# Patient Record
Sex: MALE | Race: WHITE | NOT HISPANIC OR LATINO | Employment: UNEMPLOYED
[De-identification: names, ages, dates, MRNs, and addresses within clinical notes are randomized per-mention and may not be internally consistent; named-entity substitution may affect disease eponyms.]

---

## 2019-11-07 ENCOUNTER — HEALTH MAINTENANCE LETTER (OUTPATIENT)
Age: 24
End: 2019-11-07

## 2020-11-29 ENCOUNTER — HEALTH MAINTENANCE LETTER (OUTPATIENT)
Age: 25
End: 2020-11-29

## 2021-09-25 ENCOUNTER — HEALTH MAINTENANCE LETTER (OUTPATIENT)
Age: 26
End: 2021-09-25

## 2022-01-09 ENCOUNTER — HEALTH MAINTENANCE LETTER (OUTPATIENT)
Age: 27
End: 2022-01-09

## 2022-09-22 ENCOUNTER — TELEPHONE (OUTPATIENT)
Dept: CALL CENTER | Age: 27
End: 2022-09-22

## 2022-09-22 ENCOUNTER — NURSE TRIAGE (OUTPATIENT)
Dept: NURSING | Facility: CLINIC | Age: 27
End: 2022-09-22

## 2022-09-22 NOTE — TELEPHONE ENCOUNTER
"27 year old male is a  and a chip of metal flew into his eye yesterday morning  [facility] and [facility] so he was unable to get help.  They told him they could see a piece of metal in his cornea but were unable to access equipment to remove it  He was given some antibiotic eye gtts on his way out and told to come back today but the [facility] is still in network failure   He would like to get in to have the metal removed   He states his right eye is red and very irritated  It is constantly watering.  He states he is willing to pay out of pocket      Will forward to eye team to review and call Kamari back with recommendations        Reason for Disposition    Patient wants to be seen    Foreign body (FB) hit eye at high speed (e.g., small metallic chip from hammering, lawnmower, BB gun, explosion)    Additional Information    Negative: Doesn't sound like FB in the eye    Negative: Foreign body is a chemical    Negative: Foreign body (FB) stuck on eyeball    Negative: Sharp FB (even if FB was removed) and any pain present now    Negative: Eye has been washed out > 30 minutes ago and still feels like FB is still present    Negative: Blurred vision that persists >1 hour after irrigation (regardless of duration of flushing)    Negative: Eye pain that persists > 1 hour after irrigation (regardless of duration of flushing)    Negative: Tearing or blinking that persists > 1 hour since irrigation (regardless of duration of flushing)    Negative: Cloudy spot on the cornea (clear part of the eye)    Negative: Sounds like a serious injury to the triager    Negative: SEVERE eye pain    Answer Assessment - Initial Assessment Questions  1. TYPE OF FOREIGN BODY: \"What got in the eye?\"       A piece of metal  2. ONSET: \"When did it happen?\"       Yesterday morning  3. MECHANISM: \"How did it happen?\"       He is a  and a piece flew in eye  4. VISION: \"Do you have blurred " "vision?\"       blurred  5. PAIN: \"Is it painful?\" If Yes, ask: \"How bad is the pain?\"  (Scale 1-10; or mild, moderate, severe)      moderate  6. CONTACTS: \"Do you wear contacts?\"        7. OTHER SYMPTOMS: \"Do you have any other symptoms?\"      Red and watering    8. PREGNANCY: \"Is there any chance you are pregnant?\" \"When was your last menstrual period?\"      no    Protocols used: EYE - FOREIGN BODY-A-OH      "

## 2022-09-22 NOTE — TELEPHONE ENCOUNTER
Caller reporting the following red-flag symptom(s): Metal in right eye    Per the system red-flag symptom policy, patient was instructed to:  speak with a Registered Nurse    Action:  Patient warm transferred to a Registered Nurse

## 2022-12-25 ENCOUNTER — HEALTH MAINTENANCE LETTER (OUTPATIENT)
Age: 27
End: 2022-12-25

## 2023-04-16 ENCOUNTER — HEALTH MAINTENANCE LETTER (OUTPATIENT)
Age: 28
End: 2023-04-16

## 2023-09-08 ENCOUNTER — OFFICE VISIT (OUTPATIENT)
Dept: URGENT CARE | Facility: URGENT CARE | Age: 28
End: 2023-09-08
Payer: COMMERCIAL

## 2023-09-08 VITALS
HEART RATE: 86 BPM | RESPIRATION RATE: 22 BRPM | SYSTOLIC BLOOD PRESSURE: 126 MMHG | TEMPERATURE: 98.1 F | WEIGHT: 217 LBS | DIASTOLIC BLOOD PRESSURE: 85 MMHG | OXYGEN SATURATION: 98 %

## 2023-09-08 DIAGNOSIS — H66.002 NON-RECURRENT ACUTE SUPPURATIVE OTITIS MEDIA OF LEFT EAR WITHOUT SPONTANEOUS RUPTURE OF TYMPANIC MEMBRANE: Primary | ICD-10-CM

## 2023-09-08 PROCEDURE — 99203 OFFICE O/P NEW LOW 30 MIN: CPT | Performed by: PHYSICIAN ASSISTANT

## 2023-09-08 RX ORDER — TRAZODONE HYDROCHLORIDE 100 MG/1
1 TABLET ORAL
COMMUNITY
Start: 2023-07-07

## 2023-09-08 RX ORDER — PROPRANOLOL HYDROCHLORIDE 40 MG/1
40 TABLET ORAL
COMMUNITY

## 2023-09-08 RX ORDER — PANTOPRAZOLE SODIUM 40 MG/1
40 TABLET, DELAYED RELEASE ORAL
COMMUNITY
Start: 2023-02-23

## 2023-09-08 RX ORDER — NEOMYCIN SULFATE, POLYMYXIN B SULFATE, HYDROCORTISONE 3.5; 10000; 1 MG/ML; [USP'U]/ML; MG/ML
3 SOLUTION/ DROPS AURICULAR (OTIC) 4 TIMES DAILY
Qty: 10 ML | Refills: 0 | Status: SHIPPED | OUTPATIENT
Start: 2023-09-08

## 2023-09-08 RX ORDER — AZITHROMYCIN 250 MG/1
TABLET, FILM COATED ORAL
Qty: 6 TABLET | Refills: 0 | Status: SHIPPED | OUTPATIENT
Start: 2023-09-08 | End: 2023-09-13

## 2023-09-08 RX ORDER — POTASSIUM CITRATE 10 MEQ/1
TABLET, EXTENDED RELEASE ORAL
COMMUNITY
Start: 2023-01-09 | End: 2024-01-10

## 2023-09-08 RX ORDER — SUMATRIPTAN 50 MG/1
50 TABLET, FILM COATED ORAL
COMMUNITY
Start: 2023-02-23

## 2023-09-08 ASSESSMENT — ENCOUNTER SYMPTOMS
FEVER: 0
COUGH: 1
SORE THROAT: 1
RHINORRHEA: 0
HEADACHES: 1

## 2023-09-08 ASSESSMENT — PAIN SCALES - GENERAL: PAINLEVEL: WORST PAIN (10)

## 2023-09-08 NOTE — PROGRESS NOTES
SUBJECTIVE:   Kamari Hart is a 28 year old male presenting with a chief complaint of   Chief Complaint   Patient presents with    Urgent Care    Ear Problem     Per patient has been sick for about a week [facility] and [facility] & blood work all negative but now having extreme ear pain specially left ear , patient was given tessalon pearls but feels they don't work        He is a new patient of [facility].  Patient presents with complaints of left ear pain x 7 days.  [facility], and viral panel.  Elevate WBC with elevated neutrophils.  He was given only tessalon perles.  Patient states today ear pain.      Treatment:  dayquil, niquil, tessalon perles, afrin, cepacol.     Review of Systems   Constitutional:  Negative for fever.   HENT:  Positive for ear pain and sore throat. Negative for congestion and rhinorrhea.    Respiratory:  Positive for cough.    Neurological:  Positive for headaches.   All other systems reviewed and are negative.      Past Medical History:   Diagnosis Date    Constipation     Headaches     Migraine 12/6/2011    RAD (reactive airway disease)      Family History   Problem Relation Age of Onset    Unknown/Adopted Other         no significant fam hx per patient    Hypertension Maternal Grandmother     Arthritis Maternal Grandmother     Allergies Maternal Grandmother     Gastrointestinal Disease Maternal Grandmother         ulcer    Thyroid Disease Maternal Grandmother     Lipids Maternal Grandmother     Lipids Maternal Grandfather     Hypertension Maternal Grandfather     Hearing Loss Maternal Grandfather     Cerebrovascular Disease Paternal Grandmother     Cancer No family hx of     Diabetes No family hx of     Glaucoma No family hx of     Macular Degeneration No family hx of      Current Outpatient Medications   Medication Sig Dispense Refill    azithromycin (ZITHROMAX) 250 MG tablet Take 2 tablets (500 mg) by mouth daily for 1 day, THEN 1 tablet (250 mg)  daily for 4 days. 6 tablet 0    neomycin-polymyxin-hydrocortisone (CORTISPORIN) 3.5-02992-3 otic solution Place 3 drops Into the left ear 4 times daily 10 mL 0    pantoprazole (PROTONIX) 40 MG EC tablet 40 mg      potassium citrate (UROCIT-K) 10 MEQ (1080 MG) CR tablet TAKE TWO TABLETS BY MOUTH TWO TIMES A DAY FOR KIDNEY STONES -TAKE WITH BREAKFAST AND DINNER DAILY      propranolol (INDERAL) 40 MG tablet Take 40 mg by mouth      SUMAtriptan (IMITREX) 50 MG tablet 50 mg      traZODone (DESYREL) 100 MG tablet Take 1 tablet by mouth nightly as needed      OVER-THE-COUNTER Place 1 drop into both eyes 2 times daily as needed (Use when eyes are itchy). Zaditor or Alaway over the counter Allergy Drop (Patient not taking: Reported on 9/8/2023) 1 Bottle     rizatriptan (MAXALT) 5 MG tablet Take 1 tablet by mouth at onset of headache for migraine. May repeat in 2 hours if needed: max 2/day;. (Patient not taking: Reported on 9/8/2023) 20 tablet 11     Social History     Tobacco Use    Smoking status: Never    Smokeless tobacco: Never    Tobacco comments:     Lives in smoke free household   Substance Use Topics    Alcohol use: No       OBJECTIVE  /85   Pulse 86   Temp 98.1  F (36.7  C) (Tympanic)   Resp 22   Wt 98.4 kg (217 lb)   SpO2 98%     Physical Exam  Vitals and nursing note reviewed.   Constitutional:       General: He is not in acute distress.     Appearance: Normal appearance. He is obese. He is not ill-appearing.   HENT:      Head: Normocephalic and atraumatic.      Right Ear: Tympanic membrane, ear canal and external ear normal.      Left Ear: Ear canal and external ear normal.      Ears:      Comments: Left TM erythematous and bulging     Nose: Nose normal.      Mouth/Throat:      Mouth: Mucous membranes are moist.      Pharynx: Oropharynx is clear. Posterior oropharyngeal erythema present.   Eyes:      Extraocular Movements: Extraocular movements intact.      Conjunctiva/sclera: Conjunctivae normal.    Cardiovascular:      Rate and Rhythm: Normal rate and regular rhythm.      Pulses: Normal pulses.      Heart sounds: Normal heart sounds.   Pulmonary:      Effort: Pulmonary effort is normal.      Breath sounds: Normal breath sounds.   Musculoskeletal:      Cervical back: Normal range of motion and neck supple. No rigidity. No muscular tenderness.   Skin:     General: Skin is warm and dry.   Neurological:      General: No focal deficit present.      Mental Status: He is alert.   Psychiatric:         Mood and Affect: Mood normal.         Behavior: Behavior normal.         Labs:  No results found for this or any previous visit (from the past 24 hour(s)).    X-Ray was not done.    ASSESSMENT:      ICD-10-CM    1. Non-recurrent acute suppurative otitis media of left ear without spontaneous rupture of tympanic membrane  H66.002 azithromycin (ZITHROMAX) 250 MG tablet     neomycin-polymyxin-hydrocortisone (CORTISPORIN) 3.5-92090-0 otic solution           Medical Decision Making:    Differential Diagnosis:  URI Adult/Peds:  Acute left otitis media and Otitis externa    Serious Comorbid Conditions:  Adult:   reviewed    PLAN:    Rx for zpak and cortisporin.  Discussed reasons to seek immediate medical attention.  Additionally if no improvement or worsening in one week, may follow up with PCP and/or UC.        Followup:    If not improving or if condition worsens, follow up with your Primary Care Provider, If not improving or if conditions worsens over the next 12-24 hours, go to the Emergency Department    There are no Patient Instructions on file for this visit.

## 2023-09-21 ENCOUNTER — OFFICE VISIT (OUTPATIENT)
Dept: URGENT CARE | Facility: URGENT CARE | Age: 28
End: 2023-09-21
Payer: COMMERCIAL

## 2023-09-21 ENCOUNTER — ANCILLARY PROCEDURE (OUTPATIENT)
Dept: GENERAL RADIOLOGY | Facility: CLINIC | Age: 28
End: 2023-09-21
Attending: NURSE PRACTITIONER
Payer: COMMERCIAL

## 2023-09-21 VITALS
TEMPERATURE: 97.7 F | OXYGEN SATURATION: 96 % | HEART RATE: 82 BPM | DIASTOLIC BLOOD PRESSURE: 79 MMHG | SYSTOLIC BLOOD PRESSURE: 115 MMHG | WEIGHT: 213.4 LBS

## 2023-09-21 DIAGNOSIS — J01.90 ACUTE BACTERIAL RHINOSINUSITIS: ICD-10-CM

## 2023-09-21 DIAGNOSIS — R11.2 NAUSEA AND VOMITING, UNSPECIFIED VOMITING TYPE: ICD-10-CM

## 2023-09-21 DIAGNOSIS — R53.81 MALAISE: ICD-10-CM

## 2023-09-21 DIAGNOSIS — R05.1 ACUTE COUGH: Primary | ICD-10-CM

## 2023-09-21 DIAGNOSIS — R19.7 DIARRHEA, UNSPECIFIED TYPE: ICD-10-CM

## 2023-09-21 DIAGNOSIS — R09.81 CONGESTION OF PARANASAL SINUS: ICD-10-CM

## 2023-09-21 DIAGNOSIS — B96.89 ACUTE BACTERIAL RHINOSINUSITIS: ICD-10-CM

## 2023-09-21 PROBLEM — F43.12 CHRONIC POST-TRAUMATIC STRESS DISORDER (PTSD): Status: ACTIVE | Noted: 2023-09-21

## 2023-09-21 PROBLEM — E78.5 HYPERLIPIDEMIA: Status: ACTIVE | Noted: 2023-09-21

## 2023-09-21 PROBLEM — E66.9 OBESITY: Status: ACTIVE | Noted: 2023-09-21

## 2023-09-21 PROBLEM — J30.1 ALLERGIC RHINITIS DUE TO POLLEN: Status: ACTIVE | Noted: 2017-05-31

## 2023-09-21 PROBLEM — T88.6XXS: Status: ACTIVE | Noted: 2023-09-21

## 2023-09-21 PROBLEM — M54.2 NECK PAIN: Status: ACTIVE | Noted: 2023-09-21

## 2023-09-21 PROBLEM — G89.4 CHRONIC PAIN SYNDROME: Status: ACTIVE | Noted: 2023-09-21

## 2023-09-21 PROBLEM — G56.03 CARPAL TUNNEL SYNDROME, BILATERAL UPPER LIMBS: Status: ACTIVE | Noted: 2023-09-21

## 2023-09-21 PROBLEM — N20.0 CALCULUS OF KIDNEY: Status: ACTIVE | Noted: 2023-09-21

## 2023-09-21 PROBLEM — M54.50 LOW BACK PAIN, UNSPECIFIED: Status: ACTIVE | Noted: 2023-09-21

## 2023-09-21 PROBLEM — J30.9 ALLERGIC RHINITIS: Status: ACTIVE | Noted: 2023-09-21

## 2023-09-21 PROBLEM — F33.1 MAJOR DEPRESSIVE DISORDER, RECURRENT, MODERATE (H): Status: ACTIVE | Noted: 2023-09-21

## 2023-09-21 PROBLEM — R91.1 SOLITARY PULMONARY NODULE: Status: ACTIVE | Noted: 2023-09-21

## 2023-09-21 PROBLEM — S62.639A CLOSED FRACTURE OF DISTAL PHALANX OF FINGER: Status: ACTIVE | Noted: 2023-09-21

## 2023-09-21 LAB
BASOPHILS # BLD AUTO: 0 10E3/UL (ref 0–0.2)
BASOPHILS NFR BLD AUTO: 0 %
DEPRECATED S PYO AG THROAT QL EIA: NEGATIVE
EOSINOPHIL # BLD AUTO: 0.7 10E3/UL (ref 0–0.7)
EOSINOPHIL NFR BLD AUTO: 7 %
ERYTHROCYTE [DISTWIDTH] IN BLOOD BY AUTOMATED COUNT: 11.5 % (ref 10–15)
GROUP A STREP BY PCR: NOT DETECTED
HCT VFR BLD AUTO: 47.3 % (ref 40–53)
HGB BLD-MCNC: 16.7 G/DL (ref 13.3–17.7)
IMM GRANULOCYTES # BLD: 0 10E3/UL
IMM GRANULOCYTES NFR BLD: 0 %
LYMPHOCYTES # BLD AUTO: 1.7 10E3/UL (ref 0.8–5.3)
LYMPHOCYTES NFR BLD AUTO: 17 %
MCH RBC QN AUTO: 31 PG (ref 26.5–33)
MCHC RBC AUTO-ENTMCNC: 35.3 G/DL (ref 31.5–36.5)
MCV RBC AUTO: 88 FL (ref 78–100)
MONOCYTES # BLD AUTO: 0.5 10E3/UL (ref 0–1.3)
MONOCYTES NFR BLD AUTO: 5 %
NEUTROPHILS # BLD AUTO: 7.2 10E3/UL (ref 1.6–8.3)
NEUTROPHILS NFR BLD AUTO: 72 %
PLATELET # BLD AUTO: 295 10E3/UL (ref 150–450)
RBC # BLD AUTO: 5.38 10E6/UL (ref 4.4–5.9)
WBC # BLD AUTO: 10.1 10E3/UL (ref 4–11)

## 2023-09-21 PROCEDURE — 87635 SARS-COV-2 COVID-19 AMP PRB: CPT | Performed by: NURSE PRACTITIONER

## 2023-09-21 PROCEDURE — 87651 STREP A DNA AMP PROBE: CPT | Performed by: NURSE PRACTITIONER

## 2023-09-21 PROCEDURE — 36415 COLL VENOUS BLD VENIPUNCTURE: CPT | Performed by: NURSE PRACTITIONER

## 2023-09-21 PROCEDURE — 85025 COMPLETE CBC W/AUTO DIFF WBC: CPT | Performed by: NURSE PRACTITIONER

## 2023-09-21 PROCEDURE — 99214 OFFICE O/P EST MOD 30 MIN: CPT | Performed by: NURSE PRACTITIONER

## 2023-09-21 PROCEDURE — 71046 X-RAY EXAM CHEST 2 VIEWS: CPT | Mod: TC | Performed by: RADIOLOGY

## 2023-09-21 RX ORDER — HYDROXYZINE PAMOATE 25 MG/1
25 CAPSULE ORAL 2 TIMES DAILY PRN
COMMUNITY
Start: 2023-01-04 | End: 2024-01-05

## 2023-09-21 RX ORDER — ATORVASTATIN CALCIUM 40 MG/1
40 TABLET, FILM COATED ORAL DAILY
COMMUNITY
Start: 2023-02-23

## 2023-09-21 RX ORDER — ZOLPIDEM TARTRATE 5 MG/1
1 TABLET ORAL
COMMUNITY
Start: 2023-07-07

## 2023-09-21 RX ORDER — DOXYCYCLINE HYCLATE 100 MG
100 TABLET ORAL 2 TIMES DAILY
Qty: 14 TABLET | Refills: 0 | Status: SHIPPED | OUTPATIENT
Start: 2023-09-21 | End: 2023-09-28

## 2023-09-21 NOTE — PATIENT INSTRUCTIONS
Antibiotic take Doxycycline 2 times daily with food as this may cause stomach upset. Please take with a probiotic (not directly with) two hours prior or after, to protect good bacteria in colon.   Humidifier in room if available. Recommend saline lavage to help remove mucous and moisturize sinuses.  May continue DayQuil NyQuil as needed for cough and/or fever with headache.  Push fluids especially electrolyte replacement beverage if nausea continues make sure you are taking sips of fluids and adequate urine output.      Please follow up in clinic, with your primary care provider if symptoms not improving with treatment.

## 2023-09-21 NOTE — PROGRESS NOTES
Assessment & Plan     1. Acute cough  X-ray clear per radiology read reassuring complete blood count rapid strep negative pending strep culture pending COVID test.  Recommend will continue to self isolate until these tests are completed.  - XR Chest 2 Views  - CBC with platelets and differential  - Streptococcus A Rapid Screen w/Reflex to PCR - Clinic Collect  - Symptomatic COVID-19 Virus (Coronavirus) by PCR Nose  - Group A Streptococcus PCR Throat Swab    2. Congestion of paranasal sinus    - XR Chest 2 Views  - CBC with platelets and differential  - Streptococcus A Rapid Screen w/Reflex to PCR - Clinic Collect  - Symptomatic COVID-19 Virus (Coronavirus) by PCR Nose  - Group A Streptococcus PCR Throat Swab    3. Malaise    - XR Chest 2 Views  - CBC with platelets and differential  - Streptococcus A Rapid Screen w/Reflex to PCR - Clinic Collect  - Symptomatic COVID-19 Virus (Coronavirus) by PCR Nose  - Group A Streptococcus PCR Throat Swab    4. Nausea and vomiting, unspecified vomiting type      5. Diarrhea, unspecified type      6. Acute bacterial rhinosinusitis  Recommend treatment has been directed  Home care reviewed. Patient verbalized understanding; will monitor symptoms closely. Reviewed s/e to medications.     - doxycycline hyclate (VIBRA-TABS) 100 MG tablet; Take 1 tablet (100 mg) by mouth 2 times daily for 7 days  Dispense: 14 tablet; Refill: 0  Patient Instructions   Antibiotic take Doxycycline 2 times daily with food as this may cause stomach upset. Please take with a probiotic (not directly with) two hours prior or after, to protect good bacteria in colon.   Humidifier in room if available. Recommend saline lavage to help remove mucous and moisturize sinuses.  May continue DayQuil NyQuil as needed for cough and/or fever with headache.  Push fluids especially electrolyte replacement beverage if nausea continues make sure you are taking sips of fluids and adequate urine output.      Please follow up in  clinic, with your primary care provider if symptoms not improving with treatment.               CANDI Holbrook CNP  [facility]    Bang Benites is a 28 year old male who presents to clinic today for the following health issues:  Chief Complaint   Patient presents with    Cough     Chest congestion, fever, body aches and chills, vomiting, diarrhea, sore throat. Sx about a month.   [facility], all negative.   Pt tested Covid at home this week - negative.   Prescribed antibiotics when seen in UC on the 8th, sx improved while taking meds but worsened again after meds were gone.      HPI    Patient presents to clinic with his wife. He states that he was treated for otitis media with a azithromycin and Neosporin polymyxin eardrop.  He states symptoms improved for approximately 5 to 7 days then symptoms started to recur with chest congestion malaise sore throat.  Patient states he has had 2 family members at home test positive for COVID since this time.  [facility] to being seen in urgent care regarding the otitis media.    He is also complaining of vomiting states this started yesterday with diarrhea.  States he just woke up prior to coming to  and did not drink or eat any food today or drink any fluids today.     Review of Systems  Constitutional, HEENT, cardiovascular, pulmonary, gi and gu systems are negative, except as otherwise noted.      Objective    /79 (BP Location: Right arm, Cuff Size: Adult Large)   Pulse 82   Temp 97.7  F (36.5  C) (Tympanic)   Wt 96.8 kg (213 lb 6.4 oz)   SpO2 96%   Physical Exam   GENERAL: alert, no distress, and fatigued  EYES: Eyes grossly normal to inspection, PERRL and conjunctivae and sclerae normal  HENT: normal cephalic/atraumatic, ear canals and TM's normal, nose and mouth without ulcers or lesions, nasal mucosa edematous , rhinorrhea clear, oropharynx clear, oral  mucous membranes moist, and tonsillar erythema  NECK: bilateral anterior cervical adenopathy, no asymmetry, masses, or scars, and thyroid normal to palpation  RESP: decreased breath sounds throughout  CV: regular rate and rhythm, normal S1 S2, no S3 or S4, no murmur, click or rub, no peripheral edema and peripheral pulses strong  ABDOMEN: soft, nontender, no hepatosplenomegaly, no masses and bowel sounds normal  MS: no gross musculoskeletal defects noted, no edema  SKIN: no suspicious lesions or rashes  BACK: no CVA tenderness, no paralumbar tenderness    Results for orders placed or performed in visit on 09/21/23   XR Chest 2 Views     Status: None    Narrative    XR CHEST 2 VIEWS 9/21/2023 2:41 PM    HISTORY: Acute cough; Congestion of paranasal sinus; Malaise    COMPARISON: 11/28/2011      Impression    IMPRESSION: No acute cardiopulmonary process.    LINDSAY COE MD         SYSTEM ID:  [id number]   CBC with platelets and differential     Status: None   Result Value Ref Range    WBC Count 10.1 4.0 - 11.0 10e3/uL    RBC Count 5.38 4.40 - 5.90 10e6/uL    Hemoglobin 16.7 13.3 - 17.7 g/dL    Hematocrit 47.3 40.0 - 53.0 %    MCV 88 78 - 100 fL    MCH 31.0 26.5 - 33.0 pg    MCHC 35.3 31.5 - 36.5 g/dL    RDW 11.5 10.0 - 15.0 %    Platelet Count 295 150 - 450 10e3/uL    % Neutrophils 72 %    % Lymphocytes 17 %    % Monocytes 5 %    % Eosinophils 7 %    % Basophils 0 %    % Immature Granulocytes 0 %    Absolute Neutrophils 7.2 1.6 - 8.3 10e3/uL    Absolute Lymphocytes 1.7 0.8 - 5.3 10e3/uL    Absolute Monocytes 0.5 0.0 - 1.3 10e3/uL    Absolute Eosinophils 0.7 0.0 - 0.7 10e3/uL    Absolute Basophils 0.0 0.0 - 0.2 10e3/uL    Absolute Immature Granulocytes 0.0 <=0.4 10e3/uL   Streptococcus A Rapid Screen w/Reflex to PCR - Clinic Collect     Status: Normal    Specimen: Throat; Swab   Result Value Ref Range    Group A Strep antigen Negative Negative   CBC with platelets and differential     Status: None    Narrative    The  following orders were created for panel order CBC with platelets and differential.  Procedure                               Abnormality         Status                     ---------                               -----------         ------                     CBC with platelets and d...[[id number]]                      Final result                 Please view results for these tests on the individual orders.

## 2023-09-22 LAB — SARS-COV-2 RNA RESP QL NAA+PROBE: NEGATIVE

## 2024-06-04 ENCOUNTER — OFFICE VISIT (OUTPATIENT)
Dept: URGENT CARE | Facility: URGENT CARE | Age: 29
End: 2024-06-04

## 2024-06-04 ENCOUNTER — ANCILLARY PROCEDURE (OUTPATIENT)
Dept: GENERAL RADIOLOGY | Facility: CLINIC | Age: 29
End: 2024-06-04
Attending: FAMILY MEDICINE

## 2024-06-04 VITALS
WEIGHT: 197.8 LBS | OXYGEN SATURATION: 96 % | HEART RATE: 105 BPM | RESPIRATION RATE: 20 BRPM | SYSTOLIC BLOOD PRESSURE: 146 MMHG | DIASTOLIC BLOOD PRESSURE: 94 MMHG | TEMPERATURE: 98.2 F

## 2024-06-04 DIAGNOSIS — S61.452A DOG BITE OF LEFT HAND, INITIAL ENCOUNTER: Primary | ICD-10-CM
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
## 2024-06-04 DIAGNOSIS — S61.452A DOG BITE OF LEFT HAND, INITIAL ENCOUNTER: ICD-10-CM
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
## 2024-06-04 PROCEDURE — 99214 OFFICE O/P EST MOD 30 MIN: CPT | Performed by: FAMILY MEDICINE

## 2024-06-04 PROCEDURE — 73130 X-RAY EXAM OF HAND: CPT | Mod: TC | Performed by: RADIOLOGY

## 2024-06-04 RX ORDER — METRONIDAZOLE 500 MG/1
500 TABLET ORAL 3 TIMES DAILY
Qty: 15 TABLET | Refills: 0 | Status: SHIPPED | OUTPATIENT
Start: 2024-06-04 | End: 2024-06-09

## 2024-06-04 RX ORDER — SULFAMETHOXAZOLE/TRIMETHOPRIM 800-160 MG
1 TABLET ORAL 2 TIMES DAILY
Qty: 10 TABLET | Refills: 0 | Status: SHIPPED | OUTPATIENT
Start: 2024-06-04 | End: 2024-06-09

## 2024-06-04 NOTE — PROGRESS NOTES
Chief complaint: dog bite    Dog got hit by a car on [address]  Patient rushed out there  and they were trying to load the dog and when patient picked her up the dog bit him. Dog was bleeding a lot and was in a lot of pain    Mild numbness in one area of the palm otherwise no numbness or tingling  No fevers or chills chest pain or shortness of breath  Redness warmth swelling: No  Purulent discharge: No  Numbness or weakness: No  Active Bleeding:  some mild  Fever or chills: No  Tetanus up to date: YES  Other injuries: none       Problem list and histories reviewed & adjusted, as indicated.  Additional history: as documented    Patient Active Problem List   Diagnosis    Migraine    GERD (gastroesophageal reflux disease)    Acne    Conjunctivitis, allergic    CARDIOVASCULAR SCREENING; LDL GOAL LESS THAN 160    Adjustment disorder with mixed anxiety and depressed mood    Allergic rhinitis    Allergic rhinitis due to pollen    Anaphylactic reaction due to adverse effect of correct drug or medicament properly administered, sequela    Calculus of kidney    Carpal tunnel syndrome, bilateral upper limbs    Low back pain, unspecified    Chronic pain syndrome    Chronic post-traumatic stress disorder (PTSD)    Closed fracture of distal phalanx of finger    Hyperlipidemia    Major depressive disorder, recurrent, moderate (H)    Neck pain    Obesity    Solitary pulmonary nodule     Past Surgical History:   Procedure Laterality Date    NO HISTORY OF SURGERY         Social History     Tobacco Use    Smoking status: Never    Smokeless tobacco: Never    Tobacco comments:     Lives in smoke free household   Substance Use Topics    Alcohol use: No     Family History   Problem Relation Age of Onset    Unknown/Adopted Other         no significant fam hx per patient    Hypertension Maternal Grandmother     Arthritis Maternal Grandmother     Allergies Maternal Grandmother     Gastrointestinal Disease Maternal Grandmother         ulcer  "   Thyroid Disease Maternal Grandmother     Lipids Maternal Grandmother     Lipids Maternal Grandfather     Hypertension Maternal Grandfather     Hearing Loss Maternal Grandfather     Cerebrovascular Disease Paternal Grandmother     Cancer No family hx of     Diabetes No family hx of     Glaucoma No family hx of     Macular Degeneration No family hx of          Current Outpatient Medications   Medication Sig Dispense Refill    metroNIDAZOLE (FLAGYL) 500 MG tablet Take 1 tablet (500 mg) by mouth 3 times daily for 5 days Do not drink alcohol while on this medication up to 3 days after last dose 15 tablet 0    sulfamethoxazole-trimethoprim (BACTRIM DS) 800-160 MG tablet Take 1 tablet by mouth 2 times daily for 5 days 10 tablet 0    atorvastatin (LIPITOR) 40 MG tablet Take 40 mg by mouth daily      neomycin-polymyxin-hydrocortisone (CORTISPORIN) 3.5-68831-9 otic solution Place 3 drops Into the left ear 4 times daily 10 mL 0    OVER-THE-COUNTER Place 1 drop into both eyes 2 times daily as needed (Use when eyes are itchy). Zaditor or Alaway over the counter Allergy Drop (Patient not taking: Reported on 9/8/2023) 1 Bottle     pantoprazole (PROTONIX) 40 MG EC tablet 40 mg      propranolol (INDERAL) 40 MG tablet Take 40 mg by mouth      rizatriptan (MAXALT) 5 MG tablet Take 1 tablet by mouth at onset of headache for migraine. May repeat in 2 hours if needed: max 2/day;. (Patient not taking: Reported on 9/8/2023) 20 tablet 11    SUMAtriptan (IMITREX) 50 MG tablet 50 mg      traZODone (DESYREL) 100 MG tablet Take 1 tablet by mouth nightly as needed      zolpidem (AMBIEN) 5 MG tablet Take 1 tablet by mouth nightly as needed       Allergies   Allergen Reactions    Amoxicillin Hives     Has taken pcn since with no reaction    Cefixime     Penicillins Hives and Swelling     \"hives and rash\"    Nsaids Swelling       ROS:  Constitutional, HEENT, cardiovascular, pulmonary, gi and gu systems are negative, except as otherwise " noted.    OBJECTIVE:                                                    BP (!) 146/94 (BP Location: Left arm, Patient Position: Sitting, Cuff Size: Adult Regular)   Pulse 105   Temp 98.2  F (36.8  C) (Tympanic)   Resp 20   Wt 89.7 kg (197 lb 12.8 oz)   SpO2 96%   There is no height or weight on file to calculate BMI.  GENERAL: healthy, alert and no distress  EYES: Eyes grossly normal to inspection, PERRL and conjunctivae and sclerae normal  MS: no gross musculoskeletal defects noted, no edema  NEURO: Normal strength and tone, mentation intact and speech normal  PSYCH: mentation appears normal, affect normal/bright    SKIN: left hand multiple puncture wounds: one on thenar eminence and a few on the 5th digit  No fluctuation or clinical evidence of abscess, No discharge.   No ligamentous injury on exam no bony tenderness     Diagnostic Test Results:  No results found for this or any previous visit (from the past 24 hour(s)).     ASSESSMENT/PLAN:                                                          ICD-10-CM    1. Dog bite of left hand, initial encounter  S61.452A XR Hand Left G/E 3 Views    W54.0XXA sulfamethoxazole-trimethoprim (BACTRIM DS) 800-160 MG tablet     metroNIDAZOLE (FLAGYL) 500 MG tablet        Xrays negative for fracture or foreign body to my review.   See AVS  Prescribed with above  Prescribed with metronidazole or flagyl. Warned about metallic taste and advised no alcohol until 3 days after the last dose.   It was a provoked bite. Based on CDC recommendations do not need rabies imunoprophlaxis as long as animal is available for quarantine. Instructed that animal will need to be quarantined for 10 days and if exhibiting any signs or symptoms of illness will need to be brought to the vet and rabies immunoprophylaxis for patient started.     Watch for signs or symptoms of infection. Recommend follow up with primary care provider or in clinic in 2-3 days for re-evaluation. If with any concerns  about infection come in to be seen immediately.    There are no Patient Instructions on file for this visit.    Jeni Vu MD  [facility]

## 2024-06-23 ENCOUNTER — HEALTH MAINTENANCE LETTER (OUTPATIENT)
Age: 29
End: 2024-06-23

## 2025-07-12 ENCOUNTER — HEALTH MAINTENANCE LETTER (OUTPATIENT)
Age: 30
End: 2025-07-12